# Patient Record
Sex: MALE | Race: WHITE | NOT HISPANIC OR LATINO | Employment: FULL TIME | URBAN - METROPOLITAN AREA
[De-identification: names, ages, dates, MRNs, and addresses within clinical notes are randomized per-mention and may not be internally consistent; named-entity substitution may affect disease eponyms.]

---

## 2024-07-09 DIAGNOSIS — Z00.00 HEALTH MAINTENANCE EXAMINATION: ICD-10-CM

## 2024-08-26 ENCOUNTER — HOSPITAL ENCOUNTER (OUTPATIENT)
Dept: CARDIOLOGY | Facility: HOSPITAL | Age: 44
Discharge: HOME | End: 2024-08-26
Payer: COMMERCIAL

## 2024-08-26 ENCOUNTER — HOSPITAL ENCOUNTER (OUTPATIENT)
Dept: VASCULAR MEDICINE | Facility: HOSPITAL | Age: 44
Discharge: HOME | End: 2024-08-26
Payer: COMMERCIAL

## 2024-08-26 ENCOUNTER — HOSPITAL ENCOUNTER (OUTPATIENT)
Dept: RADIOLOGY | Facility: HOSPITAL | Age: 44
Discharge: HOME | End: 2024-08-26
Payer: COMMERCIAL

## 2024-08-26 ENCOUNTER — ALLIED HEALTH (OUTPATIENT)
Dept: INTEGRATIVE MEDICINE | Facility: CLINIC | Age: 44
End: 2024-08-26

## 2024-08-26 ENCOUNTER — APPOINTMENT (OUTPATIENT)
Dept: PRIMARY CARE | Facility: CLINIC | Age: 44
End: 2024-08-26

## 2024-08-26 ENCOUNTER — OFFICE VISIT (OUTPATIENT)
Dept: DERMATOLOGY | Facility: CLINIC | Age: 44
End: 2024-08-26
Payer: COMMERCIAL

## 2024-08-26 ENCOUNTER — APPOINTMENT (OUTPATIENT)
Dept: PRIMARY CARE | Facility: CLINIC | Age: 44
End: 2024-08-26
Payer: COMMERCIAL

## 2024-08-26 VITALS
BODY MASS INDEX: 30.35 KG/M2 | HEART RATE: 61 BPM | OXYGEN SATURATION: 99 % | WEIGHT: 212 LBS | HEIGHT: 70 IN | SYSTOLIC BLOOD PRESSURE: 112 MMHG | DIASTOLIC BLOOD PRESSURE: 68 MMHG

## 2024-08-26 DIAGNOSIS — D18.01 CHERRY ANGIOMA: ICD-10-CM

## 2024-08-26 DIAGNOSIS — R93.1 AGATSTON CAC SCORE, <100: ICD-10-CM

## 2024-08-26 DIAGNOSIS — Z00.00 HEALTHCARE MAINTENANCE: Primary | ICD-10-CM

## 2024-08-26 DIAGNOSIS — Z00.00 HEALTH MAINTENANCE EXAMINATION: ICD-10-CM

## 2024-08-26 DIAGNOSIS — Z00.00 HEALTH MAINTENANCE EXAMINATION: Primary | ICD-10-CM

## 2024-08-26 DIAGNOSIS — D22.9 MELANOCYTIC NEVUS, UNSPECIFIED LOCATION: ICD-10-CM

## 2024-08-26 DIAGNOSIS — Z13.6 ENCOUNTER FOR SCREENING FOR CARDIOVASCULAR DISORDERS: ICD-10-CM

## 2024-08-26 DIAGNOSIS — Z12.83 SCREENING EXAM FOR SKIN CANCER: Primary | ICD-10-CM

## 2024-08-26 DIAGNOSIS — R93.1 AGATSTON CORONARY ARTERY CALCIUM SCORE GREATER THAN 400: ICD-10-CM

## 2024-08-26 DIAGNOSIS — R09.89 OTHER SPECIFIED SYMPTOMS AND SIGNS INVOLVING THE CIRCULATORY AND RESPIRATORY SYSTEMS: ICD-10-CM

## 2024-08-26 DIAGNOSIS — Z87.2 HISTORY OF SUN-DAMAGED SKIN: ICD-10-CM

## 2024-08-26 LAB
25(OH)D3 SERPL-MCNC: 63 NG/ML (ref 30–100)
ALBUMIN SERPL BCP-MCNC: 4.7 G/DL (ref 3.4–5)
ALP SERPL-CCNC: 64 U/L (ref 33–120)
ALT SERPL W P-5'-P-CCNC: 14 U/L (ref 10–52)
ANION GAP SERPL CALC-SCNC: 14 MMOL/L (ref 10–20)
AST SERPL W P-5'-P-CCNC: 21 U/L (ref 9–39)
BASOPHILS # BLD AUTO: 0.03 X10*3/UL (ref 0–0.1)
BASOPHILS NFR BLD AUTO: 0.6 %
BILIRUB SERPL-MCNC: 1 MG/DL (ref 0–1.2)
BUN SERPL-MCNC: 12 MG/DL (ref 6–23)
CALCIUM SERPL-MCNC: 9.5 MG/DL (ref 8.6–10.3)
CHLORIDE SERPL-SCNC: 104 MMOL/L (ref 98–107)
CHOLEST SERPL-MCNC: 149 MG/DL (ref 0–199)
CHOLESTEROL/HDL RATIO: 3.7
CO2 SERPL-SCNC: 24 MMOL/L (ref 21–32)
CREAT SERPL-MCNC: 0.8 MG/DL (ref 0.5–1.3)
CRP SERPL HS-MCNC: 2.5 MG/L
EGFRCR SERPLBLD CKD-EPI 2021: >90 ML/MIN/1.73M*2
EOSINOPHIL # BLD AUTO: 0.07 X10*3/UL (ref 0–0.7)
EOSINOPHIL NFR BLD AUTO: 1.4 %
ERYTHROCYTE [DISTWIDTH] IN BLOOD BY AUTOMATED COUNT: 12.5 % (ref 11.5–14.5)
EST. AVERAGE GLUCOSE BLD GHB EST-MCNC: 97 MG/DL
FERRITIN SERPL-MCNC: 214 NG/ML (ref 20–300)
GGT SERPL-CCNC: 14 U/L (ref 5–64)
GLUCOSE SERPL-MCNC: 93 MG/DL (ref 74–99)
HBA1C MFR BLD: 5 %
HCT VFR BLD AUTO: 47.1 % (ref 41–52)
HDLC SERPL-MCNC: 40.3 MG/DL
HGB BLD-MCNC: 16.2 G/DL (ref 13.5–17.5)
IMM GRANULOCYTES # BLD AUTO: 0.04 X10*3/UL (ref 0–0.7)
IMM GRANULOCYTES NFR BLD AUTO: 0.8 % (ref 0–0.9)
INSULIN P FAST SERPL-ACNC: 4 UIU/ML (ref 3–25)
IRON SATN MFR SERPL: 48 % (ref 25–45)
IRON SERPL-MCNC: 180 UG/DL (ref 35–150)
LDLC SERPL CALC-MCNC: 98 MG/DL
LYMPHOCYTES # BLD AUTO: 1.06 X10*3/UL (ref 1.2–4.8)
LYMPHOCYTES NFR BLD AUTO: 20.9 %
MAGNESIUM SERPL-MCNC: 2.2 MG/DL (ref 1.6–2.4)
MCH RBC QN AUTO: 30.7 PG (ref 26–34)
MCHC RBC AUTO-ENTMCNC: 34.4 G/DL (ref 32–36)
MCV RBC AUTO: 89 FL (ref 80–100)
MONOCYTES # BLD AUTO: 0.51 X10*3/UL (ref 0.1–1)
MONOCYTES NFR BLD AUTO: 10 %
NEUTROPHILS # BLD AUTO: 3.37 X10*3/UL (ref 1.2–7.7)
NEUTROPHILS NFR BLD AUTO: 66.3 %
NON HDL CHOLESTEROL: 109 MG/DL (ref 0–149)
NRBC BLD-RTO: 0 /100 WBCS (ref 0–0)
PLATELET # BLD AUTO: 249 X10*3/UL (ref 150–450)
POC APPEARANCE, URINE: CLEAR
POC BILIRUBIN, URINE: NEGATIVE
POC BLOOD, URINE: NEGATIVE
POC COLOR, URINE: YELLOW
POC GLUCOSE, URINE: NEGATIVE MG/DL
POC KETONES, URINE: NEGATIVE MG/DL
POC LEUKOCYTES, URINE: NEGATIVE
POC NITRITE,URINE: NEGATIVE
POC PH, URINE: 6 PH
POC PROTEIN, URINE: NEGATIVE MG/DL
POC SPECIFIC GRAVITY, URINE: 1.01
POC UROBILINOGEN, URINE: 0.2 EU/DL
POTASSIUM SERPL-SCNC: 4.2 MMOL/L (ref 3.5–5.3)
PROT SERPL-MCNC: 6.8 G/DL (ref 6.4–8.2)
PSA SERPL-MCNC: 0.75 NG/ML
RBC # BLD AUTO: 5.27 X10*6/UL (ref 4.5–5.9)
SODIUM SERPL-SCNC: 138 MMOL/L (ref 136–145)
T4 FREE SERPL-MCNC: 1.01 NG/DL (ref 0.61–1.12)
TIBC SERPL-MCNC: 373 UG/DL (ref 240–445)
TRIGL SERPL-MCNC: 54 MG/DL (ref 0–149)
TSH SERPL-ACNC: 1.24 MIU/L (ref 0.44–3.98)
UIBC SERPL-MCNC: 193 UG/DL (ref 110–370)
URATE SERPL-MCNC: 5.6 MG/DL (ref 4–7.5)
VIT B12 SERPL-MCNC: 413 PG/ML (ref 211–911)
VLDL: 11 MG/DL (ref 0–40)
WBC # BLD AUTO: 5.1 X10*3/UL (ref 4.4–11.3)

## 2024-08-26 PROCEDURE — 83735 ASSAY OF MAGNESIUM: CPT

## 2024-08-26 PROCEDURE — 93018 CV STRESS TEST I&R ONLY: CPT | Performed by: INTERNAL MEDICINE

## 2024-08-26 PROCEDURE — 86141 C-REACTIVE PROTEIN HS: CPT

## 2024-08-26 PROCEDURE — 75571 CT HRT W/O DYE W/CA TEST: CPT

## 2024-08-26 PROCEDURE — 84550 ASSAY OF BLOOD/URIC ACID: CPT

## 2024-08-26 PROCEDURE — 71046 X-RAY EXAM CHEST 2 VIEWS: CPT

## 2024-08-26 PROCEDURE — 83540 ASSAY OF IRON: CPT

## 2024-08-26 PROCEDURE — 93880 EXTRACRANIAL BILAT STUDY: CPT | Mod: REDUCED SERVICES | Performed by: INTERNAL MEDICINE

## 2024-08-26 PROCEDURE — 83036 HEMOGLOBIN GLYCOSYLATED A1C: CPT

## 2024-08-26 PROCEDURE — 93017 CV STRESS TEST TRACING ONLY: CPT

## 2024-08-26 PROCEDURE — 82172 ASSAY OF APOLIPOPROTEIN: CPT

## 2024-08-26 PROCEDURE — 84439 ASSAY OF FREE THYROXINE: CPT

## 2024-08-26 PROCEDURE — 84153 ASSAY OF PSA TOTAL: CPT

## 2024-08-26 PROCEDURE — NUTCO NUTRITION CONSULTATION: Performed by: DIETITIAN, REGISTERED

## 2024-08-26 PROCEDURE — 82977 ASSAY OF GGT: CPT

## 2024-08-26 PROCEDURE — 93016 CV STRESS TEST SUPVJ ONLY: CPT | Performed by: INTERNAL MEDICINE

## 2024-08-26 PROCEDURE — 84402 ASSAY OF FREE TESTOSTERONE: CPT

## 2024-08-26 PROCEDURE — 82728 ASSAY OF FERRITIN: CPT

## 2024-08-26 PROCEDURE — 76706 US ABDL AORTA SCREEN AAA: CPT | Performed by: INTERNAL MEDICINE

## 2024-08-26 PROCEDURE — 82306 VITAMIN D 25 HYDROXY: CPT

## 2024-08-26 PROCEDURE — 85025 COMPLETE CBC W/AUTO DIFF WBC: CPT

## 2024-08-26 PROCEDURE — 83525 ASSAY OF INSULIN: CPT

## 2024-08-26 PROCEDURE — 83550 IRON BINDING TEST: CPT

## 2024-08-26 PROCEDURE — 93880 EXTRACRANIAL BILAT STUDY: CPT | Mod: 52

## 2024-08-26 PROCEDURE — 84443 ASSAY THYROID STIM HORMONE: CPT

## 2024-08-26 PROCEDURE — 80053 COMPREHEN METABOLIC PANEL: CPT

## 2024-08-26 PROCEDURE — 99203 OFFICE O/P NEW LOW 30 MIN: CPT | Performed by: STUDENT IN AN ORGANIZED HEALTH CARE EDUCATION/TRAINING PROGRAM

## 2024-08-26 PROCEDURE — SMAR2 SMART-UHCSM: Performed by: PHARMACIST

## 2024-08-26 PROCEDURE — 80061 LIPID PANEL: CPT

## 2024-08-26 PROCEDURE — 82607 VITAMIN B-12: CPT

## 2024-08-26 PROCEDURE — 76706 US ABDL AORTA SCREEN AAA: CPT

## 2024-08-26 ASSESSMENT — PATIENT GLOBAL ASSESSMENT (PGA): PATIENT GLOBAL ASSESSMENT: PATIENT GLOBAL ASSESSMENT:  1 - CLEAR

## 2024-08-26 ASSESSMENT — DERMATOLOGY PATIENT ASSESSMENT
DO YOU USE A TANNING BED: NO
DO YOU USE SUNSCREEN: OCCASIONALLY
HAVE YOU HAD OR DO YOU HAVE A STAPH INFECTION: NO
HAVE YOU HAD OR DO YOU HAVE VASCULAR DISEASE: NO
ARE YOU AN ORGAN TRANSPLANT RECIPIENT: NO
DO YOU HAVE ANY NEW OR CHANGING LESIONS: NO

## 2024-08-26 ASSESSMENT — DERMATOLOGY QUALITY OF LIFE (QOL) ASSESSMENT
ARE THERE EXCLUSIONS OR EXCEPTIONS FOR THE QUALITY OF LIFE ASSESSMENT: NO
DATE THE QUALITY-OF-LIFE ASSESSMENT WAS COMPLETED: 67078
WHAT SINGLE SKIN CONDITION LISTED BELOW IS THE PATIENT ANSWERING THE QUALITY-OF-LIFE ASSESSMENT QUESTIONS ABOUT: NONE OF THE ABOVE
RATE HOW BOTHERED YOU ARE BY SYMPTOMS OF YOUR SKIN PROBLEM (EG, ITCHING, STINGING BURNING, HURTING OR SKIN IRRITATION): 0 - NEVER BOTHERED
RATE HOW BOTHERED YOU ARE BY EFFECTS OF YOUR SKIN PROBLEMS ON YOUR ACTIVITIES (EG, GOING OUT, ACCOMPLISHING WHAT YOU WANT, WORK ACTIVITIES OR YOUR RELATIONSHIPS WITH OTHERS): 0 - NEVER BOTHERED
RATE HOW EMOTIONALLY BOTHERED YOU ARE BY YOUR SKIN PROBLEM (FOR EXAMPLE, WORRY, EMBARRASSMENT, FRUSTRATION): 0 - NEVER BOTHERED

## 2024-08-26 ASSESSMENT — ITCH NUMERIC RATING SCALE: HOW SEVERE IS YOUR ITCHING?: 0

## 2024-08-26 ASSESSMENT — PAIN SCALES - GENERAL: PAINLEVEL: 0-NO PAIN

## 2024-08-26 NOTE — PROGRESS NOTES
"Edwardo Izquierdo is a 44 y.o. male presents to [facility] for stress management and resilience training in coordination with  [facility]. Lives with wife.      What are your top priorities (personal and professional)?   Coming off of 4 year streak of being on the road - this year and going forward I want to \"get some of my personal life back.\"   \"Trying to get my health back and work less\"   Bosses are on the same page  Setting boundaries is tough, \"I need to delegate more\"   Although days might be long, taking time in between to workout, eat with wife, walk on the beach.     Current sources of stress?   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 tension, breathing quickly): Shoulders and neck.   Mental/Emotional (ex. poor focus, rumination, irritable): Get short, stress builds with constant inflow of problems that stack up.       Current Self-Care/Stress Management Strategy:  Chronic stress can effect your ability to perform optimally both mentally and physically and has been linked to inflammatory conditions and chronic diseases such as elevated cholesterol, diabetes, and gastric ulcers.   When the parasympathetic nervous system is activated, the body enters a state of relaxation, allowing for recovery, repair, and immune system activation   Currently:   Experienced \"burn out\" over the past 3-4 years, has started delegating more, training/getting others ready.   Working out - helps mentally and physically  While traveling in hotels- spin, elliptical, dumb bells  While home- mountain bike, spin, rowing  Enjoys workouts he can \"go after\" to release irritation/anger/calm down.   Currently 3-5 days/week workout   20-60 mins    Anything you have been considering that you haven't tried yet?   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   Supplements?   MVI most days      Building Resilience   Mindful Eating:  Current: Eating on the go for everything between breakfast and dinner.   Has strategy already for this - planning to pack food, take 15 mins to eat at the job site.   Recommended sitting for meals, away from your desk  Discontinue working, electronics, stressful conversations, news, etc. during mealtime  3 deep breaths, feel feet on floor  \"Breathing in I calm my body, breathing out, I smile\" x 3  Engage your senses. Feel gratitude for your food  Feel the saliva pool in your mouth prior to taking the first bite.   Mindful eating improves overall digestion.   Eat the [employer]  Gut brain connection, mental health  Book: \"This is Your Brain on Food\" Dr. Chantelle See    2. Enjoyable Movement:   Spinning, rowing, walking on the beach, being outdoors - even when traveling chooses walkable hotels. Finds walking without and agenda and being outside relaxing.     3. Restorative Sleep  Quantity (how much): 6 hours  Quality (continuous vs. fragmented): Fragmented, tossing and turning  Regularity (sleep/wake time the same +/- 30 mins):  Variable  The first half of the night is dominated by non-REM deep sleep; the second half of our sleep is dominated by REM sleep  If you sleep hours later than usual (maybe because of an event) - you will likely have more REM sleep cycles and less deep sleep  Deep sleep (non-REM) is critical for learning and memory, immune repair and replenishment, metabolic function (blood sugar regulation), hormone optimization  REM sleep allows you to dream, problem solve, and regulate emotions.     Timing (in alignment with chronotype): Type 1-2 (morning person)  Chronotypes: (1) extreme morning type (sleep at 8pm, wake up at 4am); (2) morning type (sleep at 9pm, wake up at 6am); (3) neutral (sleep at 11pm, wake up at 7:30am); (4) evening type (sleep at 1am, wake up around 9:30am); (5) extreme " "evening type (sleep at 2:30am, waking up mid morning)  Bedtime currently variable at night, regular in the morning. Sometimes staying up for TV show, work, hobbies.    Last food: 7-8pm  Last exercise: Morning workouts, evening walking 7-9pm, then sits to relax prior to bed.   Last caffeine: 2 pm, 1-2 cups of coffee daily, not always afternoon, but enjoys espresso and finds it relaxing.   Last alcohol: 2-4 drinks/week, mostly weekend  Cut back on drinking 4 years ago, had noticed he didn't sleep well when he drank. Doesn't have much of an interest in drinking anymore.   Time spent on social media: Zero  Bedroom Temp:   Optimal temperature for sleep is 65 degrees Fahrenheit (18.3 degrees Celsius): 70 degrees, prefers warmer room while sleeping  To optimize circadian rhythm may consider removing unnecessary sources of light from the bedroom in addition to utilizing a sleep mask or blackout shades.    Remove electronics (phone, TV, computers, etc.) from the bedroom as these are sources of \"blue light\" which disrupts the circadian rhythm.   Will look for blue light filter on his devices  Medications/supplements for sleep?   On occasion takes a multi-vitamin stress/sleep supplement  Travel does involve different time zones, \"a little jet lag\" but not bothersome.   Recommend 1-3 mg of melatonin as needed or ashwagandha 600mg in the evening.   May consider lemon balm or leonel tea in the evening    3. Spirituality and Social Connection  Spirituality: \"I don't think about it, I think I'm ok\"   Social connection: More of an introvert, by the end of the day not looking for social interaction. Appreciates time to recharge alone.   Social connection improves your ability to recover from stress, anxiety, and depression as well as to experience a greater sense of overall well-being.      I  Heart Rate Variability (HRV)  Reflects the heart’s adaptability to different situations and provides insights into stress levels and overall " Spontaneous, unlabored and symmetrical health and well-being.  HRV is unique to each individual.   A normal HRV for adults ranges from below 20 to over 200 milliseconds.  Each person’s HRV is unique, so compare your HRV to your averages and avoid comparisons to others.   It’s perfectly normal to observe daily and seasonal fluctuations in your HRV, a sign of your heart's adaptability.  Autonomic nervous system  Higher HRV associated with rest-and-digest, general fitness, and good recovery  Your rest-and-digest system (PNS) tells your heart to slow down, making room for variability between beats  Lower HRV with fight-or-flight responses, stress, illness, or over-training  Your fight-or-flight system (SNS) tells your heart to speed up, limiting space for variability (lower HRV)  Increasing HRV  Practice deep breathing exercises and meditations  Engage in regular physical activity  Prioritize restful sleep and address sleep disturbances  Focus on nourishing foods and stay hydrated    Vagus Nerve  Understanding the role of the vagus nerve in HRV and stress reduction is key.   Activating this nerve can be beneficial in reducing stress, increasing HRV, and improving vagal tone. A higher vagal tone is associated with better executive cognitive performance, emotional regulation, and overall health.    Cold water therapy:  Immerse your face in a bowl of ice water for 5-10 seconds.  End your shower with 30-60 seconds of cold water; increase as tolerated.  Advance to cold baths/plunges if desired (max benefit at 10 min/week).  Music therapy:  Humming, singing, playing an instrument, or dancing  In undergrad took music therapy class, and was surprised in how much he enjoyed it. Tries to listen to music and understands how it affects his mood.       Assessment/Plan:   Mr. Izquierdo plans to go to bed at a regular time (9pm).   Shutting off devices earlier.   Discussed physiologic changes that occur with acute vs. chronic stress, the autonomic nervous system, evidence re:  neuroplasticity of mindfulness, and different mindfulness practices. Also discussed lifestyle habits that support the Body's natural defense mechanisms.  Practiced breathing together.   Guided meditations:   Indra Pomogatel guided meditations   Headspace graciela  Calm graciela  [place] graciela  Heart Math device/graciela  Muse device/graciela  Yoga Online:  Yoga with Cecille [email or web address] (YouTube)  Yoga with Ailyn [email or web address] (YouTube)  [employer]

## 2024-08-26 NOTE — PROGRESS NOTES
Subjective     Edwardo Izquierdo is a 44 y.o. male who presents for the following: Skin Check (FBSE, no areas of concern. No HX of skin cancer).     Denies any personal or family history of skin cancer.  Denies any new or concerning lesions. Denies history of tanning bed use or blistering sunburns.    Review of Systems:  No other skin or systemic complaints other than what is documented elsewhere in the note.    The following portions of the chart were reviewed this encounter and updated as appropriate:         Skin Cancer History  No skin cancer on file.      Specialty Problems    None       Objective   Well appearing patient in no apparent distress; mood and affect are within normal limits.    A full examination was performed including scalp, head, eyes, ears, nose, lips, neck, chest, axillae, abdomen, back, buttocks, bilateral upper extremities, bilateral lower extremities, hands, feet, fingers, toes, fingernails, and toenails. All findings within normal limits unless otherwise noted below.    Assessment/Plan   1. Screening exam for skin cancer  No other lesions concerning for skin cancer.  Several small superficial red blanching papules consistent with hemangiomas.  Brown and tan macules and papules with reassuring findings on dermoscopy consistent with benign nevi.    Total Body Skin Exam  - No other lesions concerning for malignancy on the remainder the skin exam today  - The ugly duckling sign was discussed. Recommended to monitor for any skin lesions that are different in color, shape, or size than others on body  - Sun protection was discussed. Recommend SPF 30+, hats with brims, sun protective clothing, and avoiding sun exposure between 10 AM and 2 PM whenever possible  - Follow-up visit in clinic in 2-3 years    2. History of sun-damaged skin    3. Cherry angioma    4. Melanocytic nevus, unspecified location        Follow up in 2-3 years for a full body skin exam or sooner if needed    Timo Hagen MD    I  was present during all key portions of visit including history, exam, discussion/plan and/or procedures and directly supervised our resident during all portions of the visit, follow up care, medications and more    Kermit Walsh MD

## 2024-08-26 NOTE — PROGRESS NOTES
Reason for Nutrition Visit:  It was a pleasure meeting Mr. Izquierdo today to discuss diet and nutrition as part of his initial [facility].    Past Medical Hx:  There is no problem list on file for this patient.       Weight change:  His current weight is 212lbs.  His weight is stable.  Five years ago he was closer to 235lbs.  He is trying to lose weight and he was down under 210lbs, but has gained some recently.  He would ideally like his weight to be under 200lbs.   Significant Weight Change: No    Lab Results   Component Value Date    HGBA1C 5.2 07/07/2023    CHOL 149 08/26/2024    LDLCALC 98 08/26/2024    TRIG 54 08/26/2024    HDL 40.3 08/26/2024        Food and Nutrition Hx:  He travels a lot for work.  It is quite variable.    He reports grazing throughout the day.  In the morning he will eat something small such as yogurt or RX bar.  Lunch he grazes all day on things such as fruit (apples, bananas), That's It fruit bars, RX bars, [place] granola bars, nuts, or peanut butter cracker packages.  He is really busy and on his job sites which are somewhat remote and can make it difficult to get to a restaurant.  When he is home, he will try to bring leftovers to work.  When he travels (within the ), he tries to stop at a store and stock up on snacks that he can take to job sites.  He considers dinner to be his most balanced meal of the day.  He enjoys hummus and will eat this with vegetables.  When he is home he and his wife will share cooking dinner responsibilities.  He will eat out for dinner about twice a week.  He is eating fish 1-2 times a week.  When he is traveling he will try to grab canned sardines or mackerel.      24 Hour Food Recall:  6:00am: Chobani Greek yogurt  10:00am: Garber Snaps (dried snap peas)  2:00pm: fried perch, periogies   7:00pm: mixed nuts    Beverages: water-6-10 water bottles a day; coffee-1-2 cups a day (black); alcohol-2-4 drinks a week, liquor or wine    Allergies:  None  Intolerance: None    GI Symptoms : None He has a bowel movement everyday.     Exercise: 3-5 days a week.  He mixes strength training (dumbbells) for 10-15 minutes and cardio (rower, elliptical, spinning bike) for 20 minutes.  He averages 30 minutes for his workouts.  He will occasionally go out mountain biking for  minutes, about twice a month.  He enjoys walking and will incorporate this when he can.      Sleep duration/quality : 6 hours a night.  Sometimes he has a hard time falling asleep.  He reports tossing and turning.      Supplements: Multivitamin  3-4 days a week  (Centrum brand)    Cravings: None  Energy Levels: High, he does have an afternoon slump where he uses coffee to give him a boost in energy.      Estimated Nutrient Needs:    Calories for Weight Maintenance: 5074-9281 ([place] St. Jeor x 1.3-1.4 activity factor)  Calories for Weight Loss: 2000  Protein Needs: 140g/day (0.7g/lb DBW, 200lbs)    Nutrition Diagnosis:    Diagnosis Statement 1:  Diagnosis Status: New  Diagnosis : Inadequate protein intake  related to  food- and nutrition-related knowledge deficit regarding appropriate protein intake  as evidenced by  food recall showing he is not getting the recommended 140g protein per day    Diagnosis Statement 2:  Diagnosis Status: New  Diagnosis : Inadequate fiber intake  related to food and nutrition related knowledge deficit concerning desirable quantities of fiber as evidenced by  food recall showing that he is not meeting the recommended 35-40g fiber per day    Diagnosis Statement 3:   Diagnosis Status: New  Diagnosis : Overweight related to  food- and nutrition-related knowledge deficit  as evidenced by  BMI of 30, frequent traveling for work leading to inconsistent meal structure and meals eaten out    Nutrition Interventions:  Increased Fiber Diet and Increased Protein Diet      Nutrition Goals:  Nutrition Goals : Blood glucose control  Consistent meal/snack pattern  Reduce Kcal  Intake  Weight Loss  Adequate protein intake  Adequate fiber intake    Nutrition Recommendations:    1) To promote weight loss, which should also keep lipid profile and glucose tolerance in line, try to stick to a 2000 calorie a day meal plan.    2) To help create well balanced meals while being mindful of portion sizes, use the plate model for portioning out your meals.  Fill 1/2 of your plate with non-starchy vegetables, 1/4 of your plate with lean protein (~6-7oz per meal), and 1/4 of your plate with complex carbohydrates/whole grains.  Each meal should contain the following 3 components: protein + fiber + healthy fats.    3) Aim for 35-40g fiber daily.  One way to help you reach this goal is to include non-starchy vegetables with both lunch and dinner (at least 1-2 cups).  Be sure to include a wide variety of colorful vegetables throughout the week.  Also, be sure to use 100% whole wheat/whole grain breads/pastas, brown/wild rice, quinoa, oats, beans, lentils, starchy vegetables-potatoes, sweet potatoes, corn, peas, winter squash and limit/avoid white, processed carbohydrates (white bread, white pasta, white rice, etc).    4) Ensure you are getting adequate amounts of protein consistently throughout the day.  Your daily protein goal is 140g.  Aim for 40-50g per meal and include 10-15g protein at snacks.    5) Options you can use for either breakfast or lunches while traveling to help you boost protein and fiber:  -Greek yogurt + Susanne Crunch cereal + nuts/seeds + fruit  -oatmeal + protein powder  -OWYN pre-made protein drink (other brands to look for when traveling if you cannot find this option: Iconic grass fed whey; Hollins; Orgain)  -cottage cheese + fruit  -canned fish - sardines, mackerel    6) Healthy snack options to bring with your or look for in stores when you are traveling:  -nuts  -Mejia's nut butter packets  -jerky/meat sticks (Chomps, Country [place], EPIC)  -Brami beans individual  packets  -roasted chick peas  -protein bars - RX, Aloha, No Cow    7) Consider using pre-made meals that are delivered to your home such as the ones you can order from [employer]    8) For better blood sugar regulation:  -space out meals and snacks at least 3-4 hours apart  -do not eat carbohydrates by themselves; pair with protein  -have an overnight fasting window of at least 12-14 hours  -avoid night time snacking  -eat meals in the following order: 1) vegetables, 2) protein, 3) carbohydrates    Educational Handouts: Use a Plate to Plan Your Meals

## 2024-08-28 LAB — LPA SERPL-MCNC: 14 MG/DL

## 2024-08-28 RX ORDER — ATORVASTATIN CALCIUM 20 MG/1
20 TABLET, FILM COATED ORAL DAILY
Qty: 100 TABLET | Refills: 3 | Status: SHIPPED | OUTPATIENT
Start: 2024-08-28 | End: 2025-10-02

## 2024-08-28 ASSESSMENT — ENCOUNTER SYMPTOMS
HEADACHES: 0
ANAL BLEEDING: 0
NUMBNESS: 0
CONSTIPATION: 0
ALLERGIC/IMMUNOLOGIC NEGATIVE: 1
BRUISES/BLEEDS EASILY: 0
PALPITATIONS: 0
HEMATOLOGIC/LYMPHATIC NEGATIVE: 1
POLYPHAGIA: 0
EYE ITCHING: 0
APNEA: 0
JOINT SWELLING: 0
APPETITE CHANGE: 0
SLEEP DISTURBANCE: 0
SINUS PRESSURE: 0
COLOR CHANGE: 0
POLYDIPSIA: 0
NECK STIFFNESS: 0
WEAKNESS: 0
MYALGIAS: 0
NEUROLOGICAL NEGATIVE: 1
EYE PAIN: 0
VOMITING: 0
CHEST TIGHTNESS: 0
GASTROINTESTINAL NEGATIVE: 1
LIGHT-HEADEDNESS: 0
DIARRHEA: 0
RHINORRHEA: 0
CHILLS: 0
BACK PAIN: 0
ABDOMINAL DISTENTION: 0
NERVOUS/ANXIOUS: 0
TREMORS: 0
CONFUSION: 0
FLANK PAIN: 0
EYE REDNESS: 0
UNEXPECTED WEIGHT CHANGE: 0
VOICE CHANGE: 0
EYE DISCHARGE: 0
ARTHRALGIAS: 0
AGITATION: 0
ABDOMINAL PAIN: 0
SINUS PAIN: 0
SORE THROAT: 0
DYSURIA: 0
WOUND: 0
FREQUENCY: 0
WHEEZING: 0
DIZZINESS: 0
HEMATURIA: 0
ENDOCRINE NEGATIVE: 1
COUGH: 0
NECK PAIN: 0
DIFFICULTY URINATING: 0
FATIGUE: 0
SHORTNESS OF BREATH: 0
RECTAL PAIN: 0
SEIZURES: 0
BLOOD IN STOOL: 0
TROUBLE SWALLOWING: 0
ADENOPATHY: 0
NAUSEA: 0
PHOTOPHOBIA: 0
DIAPHORESIS: 0
FEVER: 0

## 2024-08-28 NOTE — PROGRESS NOTES
Executive Physical         Patient ID: Edwardo Izquierdo is a 44 y.o. male who presents for Executive Evaluation:    The following report is in reference to your  executive physical examination which was held at [facility] on 08/28/24. Firstly, let me state that it was a pleasure meeting with you and that we appreciate you coming to [facility] for your executive evaluation.    At the time of your evaluation you were feeling well with no significant health concerns.    You were not complaining of fever ,chills, headache ,dizziness ,cough, chest pain shortness of breath, palpitations, nausea, vomiting, abdominal pain, loss of appetite, diarrhea, blood in the stools, frequent urination or painful urination.    No past medical history on file.      Review of Systems   Constitutional:  Negative for appetite change, chills, diaphoresis, fatigue, fever and unexpected weight change.   HENT:  Negative for congestion, ear discharge, ear pain, hearing loss, mouth sores, nosebleeds, postnasal drip, rhinorrhea, sinus pressure, sinus pain, sneezing, sore throat, tinnitus, trouble swallowing and voice change.    Eyes:  Negative for photophobia, pain, discharge, redness, itching and visual disturbance.   Respiratory:  Negative for apnea, cough, chest tightness, shortness of breath and wheezing.    Cardiovascular:  Negative for chest pain, palpitations and leg swelling.   Gastrointestinal: Negative.  Negative for abdominal distention, abdominal pain, anal bleeding, blood in stool, constipation, diarrhea, nausea, rectal pain and vomiting.   Endocrine: Negative.  Negative for cold intolerance, heat intolerance, polydipsia, polyphagia and polyuria.   Genitourinary:  Negative for decreased urine volume, difficulty urinating, dysuria, enuresis, flank pain, frequency, hematuria, penile discharge, penile pain, scrotal swelling, testicular pain and urgency.   Musculoskeletal:   "Negative for arthralgias, back pain, gait problem, joint swelling, myalgias, neck pain and neck stiffness.   Skin:  Negative for color change, pallor, rash and wound.   Allergic/Immunologic: Negative.    Neurological: Negative.  Negative for dizziness, tremors, seizures, syncope, weakness, light-headedness, numbness and headaches.   Hematological: Negative.  Negative for adenopathy. Does not bruise/bleed easily.   Psychiatric/Behavioral:  Negative for agitation, confusion, sleep disturbance and suicidal ideas. The patient is not nervous/anxious.                 Family History   Problem Relation Name Age of Onset    Hyperlipidemia Father      Diabetes Maternal Grandfather      Stroke Paternal Grandmother      Heart disease Paternal Grandfather          No Known Allergies         Visit Vitals  /68 (BP Location: Right arm, Patient Position: Sitting, BP Cuff Size: Large adult)   Pulse 61   Ht 1.778 m (5' 10\")   Wt 96.2 kg (212 lb)   SpO2 99%   BMI 30.42 kg/m²   Smoking Status Never   BSA 2.18 m²      Waist circumference =38 inches    Physical Exam  Vitals reviewed.   Constitutional:       Appearance: Normal appearance.   HENT:      Head: Normocephalic and atraumatic.      Right Ear: Tympanic membrane, ear canal and external ear normal.      Left Ear: Tympanic membrane, ear canal and external ear normal.      Nose: Nose normal.      Mouth/Throat:      Mouth: Mucous membranes are moist.   Eyes:      Extraocular Movements: Extraocular movements intact.      Conjunctiva/sclera: Conjunctivae normal.      Pupils: Pupils are equal, round, and reactive to light.   Cardiovascular:      Rate and Rhythm: Normal rate and regular rhythm.      Pulses: Normal pulses.      Heart sounds: Normal heart sounds. No murmur heard.     No friction rub. No gallop.   Pulmonary:      Effort: Pulmonary effort is normal. No respiratory distress.      Breath sounds: Normal breath sounds. No wheezing, rhonchi or rales.   Chest:      Chest wall: " No tenderness.   Abdominal:      General: Abdomen is flat. Bowel sounds are normal. There is no distension.      Palpations: Abdomen is soft. There is no mass.      Tenderness: There is no abdominal tenderness. There is no guarding or rebound.      Hernia: No hernia is present.   Genitourinary:     Penis: Normal.       Testes: Normal.      Prostate: Normal.      Rectum: Normal. Guaiac result negative.   Musculoskeletal:         General: No swelling, tenderness or deformity. Normal range of motion.      Cervical back: Normal range of motion.   Skin:     General: Skin is warm.      Findings: No bruising, lesion or rash.   Neurological:      General: No focal deficit present.      Mental Status: He is alert and oriented to person, place, and time.      Sensory: No sensory deficit.      Motor: No weakness.      Coordination: Coordination normal.   Psychiatric:         Mood and Affect: Mood normal.         Behavior: Behavior normal.        Ancillary studies:    Vision screening- Deferred    Bone density - Normal     Audiogram -Normal     Discussion/Summary  In summary you are 44 y.o. male with no  past medical history of note .  At the time of your evaluation you were feeling well with no significant health concerns.    Physical examination including blood pressure and cardiovascular exam was unremarkable. The BMI / % body fat was elevated.    Lab studies including complete blood count, comprehensive metabolic panel, fasting lipid panel, thyroid function, Prostate  specific antigen (PSA), Vitamin D, Vitamin B12, Magnesium and inflammatory markers were normal.      Cardiology- Normal EKG, Exercise stress test, Carotid and Abdominal Aorta ultrasound studies.     Abnormal  CT-Cardiac score > 1000 which is consistent with extensive plaque burden. There is a high likelihood of at least one significant coronary stenosis ( > 50% diameter )    Elevated LDL-cholesterol levels > 70- Limit animal fats( red meat, cheese, shell fish,   egg yolks, full fat dairy/yoghurt and processed meats).  Instead, replace some of the saturated fats in your diet with healthier unsaturated fats, which are found in fish, nuts, avocados and vegetable oils, such as olive oil, canola oil and safflower oil.  Please see link below for additional information:    https://[email or web address].org.uk/low-cholesterol-foods/choose-low-cholesterol-food     Start Atorvastatin 20 mg daily. Repeat Cholesterol levels in 4-6 weeks. Lab orders   placed in Epic      Elevated BMI= 30 ( 19-25) - Increase protein/fish/fiber/fruit/vegetables/water  whole grain intake  and limit processed/refined carbohydrates and added sugars. Increased physical activity to a minimum of 150 minutes of moderate exercise per week.      Cancer screening- you are current with prostate and lung cancer screening tests. Recommend screening colonoscopy at your earliest convenience after cardiac evaluation.    Skin - Please follow up with dermatology for annual examination.Please use a broad-spectrum sunscreen with an SPF of 30 or higher. Monitor moles for ABCDE ( asymmetry, border irregularity, color changes, diameter> pencil eraser and evolving )    Vaccine- I would  recommend a shingles (Shingrix) vaccine at age 50  onwards.  In addition I recommend a tetanus booster every 10 years to maintain immunity.  Recommend pneumonia vaccine (Prevnar 20) at age  65.  Consider COVID-19 booster and flu shot next fall.      Wellness: Please continue with a balanced diet and a regular physical activity program for at least 150 minutes/week of moderate exercise and 30 minutes/week of resistance/weight training per week.  Try to get 6 to 8 hours of sleep per night.  Please download the calm or [email or web address] rené from the [employer] to assist with stress and sleep management if necessary.    In conclusion,  I wish to thank you for attending the  executive health program at [facility].  I wish you and your family a safe and  healthy Summer/Fall  season.    Please email me at [email or web address] or call me at [phone] if you have any questions pertaining to this report or any other medical concerns.    Be Well    Dr KATE Crowder and Imani Gamble Master Clinician in Wellness    Senior Attending Physician , [facility] [place]    [facility]    Clinical     [employer] [employer]    [place], [place]    This note was partially generated using the Dragon voice recognition system.  There may be some incorrect wording ,grammar, spelling or punctuation errors that were not corrected prior to committing the note to the medical record.

## 2024-08-30 ENCOUNTER — APPOINTMENT (OUTPATIENT)
Dept: LAB | Facility: LAB | Age: 44
End: 2024-08-30
Payer: COMMERCIAL

## 2024-08-30 LAB
TESTOSTERONE FREE (CHAN): 62.3 PG/ML (ref 35–155)
TESTOSTERONE,TOTAL,LC-MS/MS: 436 NG/DL (ref 250–1100)

## 2024-09-20 ENCOUNTER — LAB (OUTPATIENT)
Dept: LAB | Facility: LAB | Age: 44
End: 2024-09-20
Payer: COMMERCIAL

## 2024-09-20 DIAGNOSIS — R93.1 AGATSTON CORONARY ARTERY CALCIUM SCORE GREATER THAN 400: ICD-10-CM

## 2024-09-20 LAB
CHOLEST SERPL-MCNC: 95 MG/DL (ref 0–199)
CHOLESTEROL/HDL RATIO: 2.4
HDLC SERPL-MCNC: 40.1 MG/DL
LDLC SERPL CALC-MCNC: 46 MG/DL
NON HDL CHOLESTEROL: 55 MG/DL (ref 0–149)
TRIGL SERPL-MCNC: 45 MG/DL (ref 0–149)
VLDL: 9 MG/DL (ref 0–40)

## 2024-09-20 PROCEDURE — 80061 LIPID PANEL: CPT

## 2024-09-26 ENCOUNTER — OFFICE VISIT (OUTPATIENT)
Dept: CARDIOLOGY | Facility: CLINIC | Age: 44
End: 2024-09-26
Payer: COMMERCIAL

## 2024-09-26 VITALS
WEIGHT: 205 LBS | SYSTOLIC BLOOD PRESSURE: 126 MMHG | OXYGEN SATURATION: 98 % | BODY MASS INDEX: 30.36 KG/M2 | HEIGHT: 69 IN | HEART RATE: 66 BPM | DIASTOLIC BLOOD PRESSURE: 87 MMHG

## 2024-09-26 DIAGNOSIS — R93.1 AGATSTON CORONARY ARTERY CALCIUM SCORE GREATER THAN 400: ICD-10-CM

## 2024-09-26 PROCEDURE — 99214 OFFICE O/P EST MOD 30 MIN: CPT | Performed by: INTERNAL MEDICINE

## 2024-09-26 PROCEDURE — 93005 ELECTROCARDIOGRAM TRACING: CPT | Performed by: INTERNAL MEDICINE

## 2024-09-26 PROCEDURE — 3008F BODY MASS INDEX DOCD: CPT | Performed by: INTERNAL MEDICINE

## 2024-09-26 PROCEDURE — 99204 OFFICE O/P NEW MOD 45 MIN: CPT | Performed by: INTERNAL MEDICINE

## 2024-09-26 RX ORDER — NAPROXEN SODIUM 220 MG/1
81 TABLET, FILM COATED ORAL DAILY
Start: 2024-09-26 | End: 2025-09-26

## 2024-09-26 RX ORDER — CHOLECALCIFEROL (VITAMIN D3) 125 MCG
CAPSULE ORAL AS NEEDED
COMMUNITY

## 2024-09-26 RX ORDER — METOPROLOL TARTRATE 50 MG/1
50 TABLET ORAL ONCE
Qty: 1 TABLET | Refills: 0 | Status: SHIPPED | OUTPATIENT
Start: 2024-09-26 | End: 2024-09-26

## 2024-09-26 ASSESSMENT — ENCOUNTER SYMPTOMS
ALLERGIC/IMMUNOLOGIC NEGATIVE: 1
GASTROINTESTINAL NEGATIVE: 1
CARDIOVASCULAR NEGATIVE: 1
NEUROLOGICAL NEGATIVE: 1
CONSTITUTIONAL NEGATIVE: 1
MUSCULOSKELETAL NEGATIVE: 1
HEMATOLOGIC/LYMPHATIC NEGATIVE: 1
EYES NEGATIVE: 1
RESPIRATORY NEGATIVE: 1
PSYCHIATRIC NEGATIVE: 1
ENDOCRINE NEGATIVE: 1

## 2024-09-26 ASSESSMENT — PATIENT HEALTH QUESTIONNAIRE - PHQ9
SUM OF ALL RESPONSES TO PHQ9 QUESTIONS 1 AND 2: 0
1. LITTLE INTEREST OR PLEASURE IN DOING THINGS: NOT AT ALL
2. FEELING DOWN, DEPRESSED OR HOPELESS: NOT AT ALL

## 2024-09-26 ASSESSMENT — COLUMBIA-SUICIDE SEVERITY RATING SCALE - C-SSRS
6. HAVE YOU EVER DONE ANYTHING, STARTED TO DO ANYTHING, OR PREPARED TO DO ANYTHING TO END YOUR LIFE?: NO
1. IN THE PAST MONTH, HAVE YOU WISHED YOU WERE DEAD OR WISHED YOU COULD GO TO SLEEP AND NOT WAKE UP?: NO
2. HAVE YOU ACTUALLY HAD ANY THOUGHTS OF KILLING YOURSELF?: NO

## 2024-09-26 ASSESSMENT — PAIN SCALES - GENERAL: PAINLEVEL: 0-NO PAIN

## 2024-09-26 NOTE — PROGRESS NOTES
Preventive Cardiology Clinic Note    Reason for Visit: Elevated calcium score  Referring Clinician: Jesu     History of Present Illness: Edwardo Izquierdo is a 44 y.o. male with no prior medical history who is referred after an executive physical noted an elevated coronary calcium score on screening test.  He does not have a personal history of cardiovascular disease and is very active and does not have any exertional chest pain, shortness of breath, palpitations, or syncope.  He does have a family history of cardiovascular disease in his father.  He is a non-smoker.  Previously he did not take any chronic medications although he did start taking atorvastatin after his calcium score test came back abnormal.  He also did a stress test which was normal at the same time.  His calcium score showed a single-vessel RCA with a score of 1300 along with evidence of pericardial calcification potential sequela of prior inflammation.  He does have a history of COVID-19 infection several times as well as influenza.  He also has a family history of discoid lupus in his brother and has had a positive RENATO in the past with no clinical evidence of lupus.    Past Medical History:  Past medical history as above in HPI    Past Surgical History:  He has no past surgical history on file.    Social History:  He reports that he has never smoked. He does not have any smokeless tobacco history on file. He reports current alcohol use.    Family History:  Family History   Problem Relation Name Age of Onset    Hyperlipidemia Father      Diabetes Maternal Grandfather      Stroke Paternal Grandmother      Heart disease Paternal Grandfather         Allergies:  Patient has no known allergies.    Outpatient Medications:  Current Outpatient Medications   Medication Instructions    aspirin 81 mg, oral, Daily    atorvastatin (LIPITOR) 20 mg, oral, Daily    naproxen sodium 220 mg capsule oral, As needed       Review of Systems:  Review of Systems  "  Constitutional: Negative.   HENT: Negative.     Eyes: Negative.    Cardiovascular: Negative.    Respiratory: Negative.     Endocrine: Negative.    Hematologic/Lymphatic: Negative.    Skin: Negative.    Musculoskeletal: Negative.    Gastrointestinal: Negative.    Genitourinary: Negative.    Neurological: Negative.    Psychiatric/Behavioral: Negative.     Allergic/Immunologic: Negative.        Last Recorded Vitals:  Vitals:    09/26/24 1020   BP: 126/87   BP Location: Left arm   Patient Position: Sitting   Pulse: 66   SpO2: 98%   Weight: 93 kg (205 lb)   Height: 1.753 m (5' 9\")       Physical Examination:  General: Well appearing, well-nourished, in no acute distress.  HEENT: Normocephalic atraumatic, pupils equal and reactive to light, extraocular muscles intact, no conjunctival injection, oropharynx clear without exudates.  Neck: Normal carotid arterial pulses, no arterial bruits, no thyromegaly.  Cardiac: Regular rhythm and normal heart rate.  S1, S2 present and normal.  No murmurs, rubs, or gallops.  PMI is nondisplaced. Jugular venous pulsations are normal.  Pulmonary: Normal breath sounds, no increased work of breathing, no wheezes or crackles.  GI: Normal bowel sounds, abdominal aorta not enlarged, no hepatosplenomegaly, no abdominal bruits.  Lower extremities: No cyanosis, clubbing, or edema.  No xanthelasma present. Normal distal pulses.  Skin: Skin intact. No significant rashes or lesions present.  Neuro: Alert and oriented x 3, normal attention and cognition, no focal motor or sensory neurologic deficits.  Psych: Normal affect and mood.  Musculoskeletal: Normal gait normal muscle tone.    Laboratory Studies:  Lab Results   Component Value Date    GLUCOSE 93 08/26/2024    CALCIUM 9.5 08/26/2024     08/26/2024    K 4.2 08/26/2024    CO2 24 08/26/2024     08/26/2024    BUN 12 08/26/2024    CREATININE 0.80 08/26/2024     Lab Results   Component Value Date    ALT 14 08/26/2024    AST 21 08/26/2024 " "   GGT 14 2024    ALKPHOS 64 2024    BILITOT 1.0 2024     Results from last 7 days   Lab Units 24  0910   CHOLESTEROL mg/dL 95   TRIGLYCERIDES mg/dL 45   HDL mg/dL 40.1     Lab Results   Component Value Date    CHOL 95 2024    CHOL 149 2024     Lab Results   Component Value Date    HDL 40.1 2024    HDL 40.3 2024     Lab Results   Component Value Date    LDLCALC 46 2024    LDLCALC 98 2024     Lab Results   Component Value Date    TRIG 45 2024    TRIG 54 2024     No components found for: \"CHOLHDL\"  Lab Results   Component Value Date    HGBA1C 5.0 2024       Cardiology Tests:  EC2024  ECG in the office today shows normal sinus rhythm, ventricular rate of 64 bpm, normal intervals and axis, normal ECG.    Stress Test:  Stress Test 2024  The patient exercised to stage IV on a Isael protocol for 11 minutes and 33 seconds, achieving 12 METS.   Stress ECG: Stress ECG showed sinus tachycardia, with PVC's. No ST changes.    1. Adequate level of stress achieved.   2. Negative exercise ECG for inducible ischemia at a high work load.    Cardiac Imaging:  CT cardiac scoring wo IV contrast 2024  Left Main (LM) 0  Left Anterior Descending (LAD) 0  Left Circumflex (LCX) 0  Right Coronary Artery (RCA) 1338  Total Agatston Score 1338  Coronary Calcium Score: 1338  Pericardial calcifications are present which may be indicative of  previous inflammatory change of the pericardium.    Carotid duplex 2024  Normal    Assessment and Plan:  Problem List Items Addressed This Visit    None  Visit Diagnoses       Agatston coronary artery calcium score greater than 400        Relevant Medications    aspirin 81 mg chewable tablet    Other Relevant Orders    ECG 12 Lead          Edwardo Izquierdo is a 44 y.o. male with no prior medical history who is referred after an executive physical noted an elevated coronary calcium score on screening test.  His " calcium score is in the very elevated range although I wonder if it may be related to sequela of pericardial inflammation such as from a prior infection given that it is any single-vessel and he has no other significant risk factors for atherosclerosis.  Nevertheless for further restratification purposes we discussed referral for CT coronary angiogram to rule out obstructive coronary artery disease.  I did discuss that if it would be abnormal we would need to refer him for cardiac catheterization.  I also ordered low-dose metoprolol to be taken 1 to 2 hours prior to the scan and place an order for repeat creatinine/eGFR if needed for the test.  He did start low-dose aspirin and atorvastatin and his cholesterol and repeat check was approximate 50% lower which is expected and appropriate.  After the test is completed I will contact him with the results and further recommendations.  Please do not hesitate call or contact me with questions or concerns.    Felton Knapp MD, FAFORREST, FACC  Director,  [facility]  Director,  [facility] Program  Associate Professor of Medicine  [employer]

## 2024-09-29 LAB
ATRIAL RATE: 64 BPM
P AXIS: 67 DEGREES
P OFFSET: 202 MS
P ONSET: 146 MS
PR INTERVAL: 152 MS
Q ONSET: 222 MS
QRS COUNT: 11 BEATS
QRS DURATION: 84 MS
QT INTERVAL: 374 MS
QTC CALCULATION(BAZETT): 385 MS
QTC FREDERICIA: 382 MS
R AXIS: 26 DEGREES
T AXIS: 67 DEGREES
T OFFSET: 409 MS
VENTRICULAR RATE: 64 BPM

## 2024-09-30 ENCOUNTER — HOSPITAL ENCOUNTER (OUTPATIENT)
Dept: RADIOLOGY | Facility: CLINIC | Age: 44
Discharge: HOME | End: 2024-09-30
Payer: COMMERCIAL

## 2024-09-30 VITALS — SYSTOLIC BLOOD PRESSURE: 129 MMHG | DIASTOLIC BLOOD PRESSURE: 77 MMHG | HEART RATE: 72 BPM | OXYGEN SATURATION: 100 %

## 2024-09-30 DIAGNOSIS — R93.1 AGATSTON CORONARY ARTERY CALCIUM SCORE GREATER THAN 400: ICD-10-CM

## 2024-09-30 PROCEDURE — 2500000001 HC RX 250 WO HCPCS SELF ADMINISTERED DRUGS (ALT 637 FOR MEDICARE OP): Performed by: INTERNAL MEDICINE

## 2024-09-30 PROCEDURE — 2550000001 HC RX 255 CONTRASTS: Performed by: INTERNAL MEDICINE

## 2024-09-30 PROCEDURE — 75574 CT ANGIO HRT W/3D IMAGE: CPT | Performed by: INTERNAL MEDICINE

## 2024-09-30 PROCEDURE — 75574 CT ANGIO HRT W/3D IMAGE: CPT

## 2024-09-30 RX ORDER — NITROGLYCERIN 400 UG/1
2 SPRAY ORAL ONCE
Status: COMPLETED | OUTPATIENT
Start: 2024-09-30 | End: 2024-09-30

## 2024-10-10 ENCOUNTER — APPOINTMENT (OUTPATIENT)
Dept: CARDIOLOGY | Facility: CLINIC | Age: 44
End: 2024-10-10
Payer: COMMERCIAL

## 2024-10-25 ENCOUNTER — HOSPITAL ENCOUNTER (OUTPATIENT)
Dept: RADIOLOGY | Facility: CLINIC | Age: 44
End: 2024-10-25
Payer: COMMERCIAL

## 2024-11-01 ENCOUNTER — APPOINTMENT (OUTPATIENT)
Dept: PRIMARY CARE | Facility: CLINIC | Age: 44
End: 2024-11-01
Payer: COMMERCIAL

## 2024-11-01 VITALS
BODY MASS INDEX: 31.4 KG/M2 | DIASTOLIC BLOOD PRESSURE: 84 MMHG | TEMPERATURE: 97.9 F | HEART RATE: 85 BPM | OXYGEN SATURATION: 99 % | WEIGHT: 212 LBS | SYSTOLIC BLOOD PRESSURE: 112 MMHG | RESPIRATION RATE: 16 BRPM | HEIGHT: 69 IN

## 2024-11-01 DIAGNOSIS — Z00.00 HEALTH MAINTENANCE EXAMINATION: ICD-10-CM

## 2024-11-01 DIAGNOSIS — E78.5 HYPERLIPIDEMIA, UNSPECIFIED HYPERLIPIDEMIA TYPE: ICD-10-CM

## 2024-11-01 DIAGNOSIS — Z12.11 SCREEN FOR COLON CANCER: Primary | ICD-10-CM

## 2024-11-01 ASSESSMENT — ENCOUNTER SYMPTOMS
MYALGIAS: 0
DIZZINESS: 0
NAUSEA: 0
DIARRHEA: 0
VOMITING: 0
DIAPHORESIS: 0
LIGHT-HEADEDNESS: 0
UNEXPECTED WEIGHT CHANGE: 0
CHILLS: 0
FEVER: 0
SHORTNESS OF BREATH: 0
DYSURIA: 0

## 2024-11-01 ASSESSMENT — PATIENT HEALTH QUESTIONNAIRE - PHQ9
2. FEELING DOWN, DEPRESSED OR HOPELESS: NOT AT ALL
1. LITTLE INTEREST OR PLEASURE IN DOING THINGS: NOT AT ALL
SUM OF ALL RESPONSES TO PHQ9 QUESTIONS 1 & 2: 0

## 2024-12-17 LAB — NONINV COLON CA DNA+OCC BLD SCRN STL QL: POSITIVE

## 2024-12-19 DIAGNOSIS — R19.5 ABNORMAL FINDINGS IN STOOL: Primary | ICD-10-CM

## 2025-03-03 ENCOUNTER — APPOINTMENT (OUTPATIENT)
Dept: OPERATING ROOM | Facility: CLINIC | Age: 45
End: 2025-03-03
Payer: COMMERCIAL

## 2025-04-15 ENCOUNTER — PREP FOR PROCEDURE (OUTPATIENT)
Dept: SURGERY | Facility: HOSPITAL | Age: 45
End: 2025-04-15
Payer: COMMERCIAL

## 2025-04-15 RX ORDER — SODIUM CHLORIDE, SODIUM LACTATE, POTASSIUM CHLORIDE, CALCIUM CHLORIDE 600; 310; 30; 20 MG/100ML; MG/100ML; MG/100ML; MG/100ML
100 INJECTION, SOLUTION INTRAVENOUS CONTINUOUS
Status: CANCELLED | OUTPATIENT
Start: 2025-04-15 | End: 2025-04-16

## 2025-04-17 ENCOUNTER — ANESTHESIA (OUTPATIENT)
Dept: GASTROENTEROLOGY | Facility: HOSPITAL | Age: 45
End: 2025-04-17
Payer: COMMERCIAL

## 2025-04-17 ENCOUNTER — HOSPITAL ENCOUNTER (OUTPATIENT)
Dept: GASTROENTEROLOGY | Facility: HOSPITAL | Age: 45
Discharge: HOME | End: 2025-04-17
Payer: COMMERCIAL

## 2025-04-17 ENCOUNTER — ANESTHESIA EVENT (OUTPATIENT)
Dept: GASTROENTEROLOGY | Facility: HOSPITAL | Age: 45
End: 2025-04-17
Payer: COMMERCIAL

## 2025-04-17 VITALS
HEART RATE: 60 BPM | SYSTOLIC BLOOD PRESSURE: 110 MMHG | DIASTOLIC BLOOD PRESSURE: 75 MMHG | RESPIRATION RATE: 18 BRPM | BODY MASS INDEX: 30.06 KG/M2 | TEMPERATURE: 97.3 F | OXYGEN SATURATION: 98 % | HEIGHT: 70 IN | WEIGHT: 210 LBS

## 2025-04-17 DIAGNOSIS — R19.5 ABNORMAL FINDINGS IN STOOL: ICD-10-CM

## 2025-04-17 PROCEDURE — 2500000001 HC RX 250 WO HCPCS SELF ADMINISTERED DRUGS (ALT 637 FOR MEDICARE OP): Performed by: STUDENT IN AN ORGANIZED HEALTH CARE EDUCATION/TRAINING PROGRAM

## 2025-04-17 PROCEDURE — 45380 COLONOSCOPY AND BIOPSY: CPT | Performed by: STUDENT IN AN ORGANIZED HEALTH CARE EDUCATION/TRAINING PROGRAM

## 2025-04-17 PROCEDURE — 2500000004 HC RX 250 GENERAL PHARMACY W/ HCPCS (ALT 636 FOR OP/ED): Performed by: NURSE ANESTHETIST, CERTIFIED REGISTERED

## 2025-04-17 PROCEDURE — 7100000009 HC PHASE TWO TIME - INITIAL BASE CHARGE: Performed by: STUDENT IN AN ORGANIZED HEALTH CARE EDUCATION/TRAINING PROGRAM

## 2025-04-17 PROCEDURE — 7100000010 HC PHASE TWO TIME - EACH INCREMENTAL 1 MINUTE: Performed by: STUDENT IN AN ORGANIZED HEALTH CARE EDUCATION/TRAINING PROGRAM

## 2025-04-17 PROCEDURE — 3700000001 HC GENERAL ANESTHESIA TIME - INITIAL BASE CHARGE: Performed by: STUDENT IN AN ORGANIZED HEALTH CARE EDUCATION/TRAINING PROGRAM

## 2025-04-17 PROCEDURE — 3700000002 HC GENERAL ANESTHESIA TIME - EACH INCREMENTAL 1 MINUTE: Performed by: STUDENT IN AN ORGANIZED HEALTH CARE EDUCATION/TRAINING PROGRAM

## 2025-04-17 RX ORDER — LIDOCAINE HYDROCHLORIDE 20 MG/ML
INJECTION, SOLUTION INFILTRATION; PERINEURAL AS NEEDED
Status: DISCONTINUED | OUTPATIENT
Start: 2025-04-17 | End: 2025-04-17

## 2025-04-17 RX ORDER — DEXTROMETHORPHAN/PSEUDOEPHED 2.5-7.5/.8
DROPS ORAL AS NEEDED
Status: COMPLETED | OUTPATIENT
Start: 2025-04-17 | End: 2025-04-17

## 2025-04-17 RX ORDER — PROPOFOL 10 MG/ML
INJECTION, EMULSION INTRAVENOUS AS NEEDED
Status: DISCONTINUED | OUTPATIENT
Start: 2025-04-17 | End: 2025-04-17

## 2025-04-17 RX ADMIN — SODIUM CHLORIDE, POTASSIUM CHLORIDE, SODIUM LACTATE AND CALCIUM CHLORIDE: 600; 310; 30; 20 INJECTION, SOLUTION INTRAVENOUS at 11:33

## 2025-04-17 RX ADMIN — LIDOCAINE HYDROCHLORIDE 50 MG: 20 INJECTION, SOLUTION INFILTRATION; PERINEURAL at 11:38

## 2025-04-17 RX ADMIN — PROPOFOL 120 MCG/KG/MIN: 10 INJECTION, EMULSION INTRAVENOUS at 11:41

## 2025-04-17 RX ADMIN — SIMETHICONE 333 MG: 20 EMULSION ORAL at 11:49

## 2025-04-17 RX ADMIN — PROPOFOL 100 MG: 10 INJECTION, EMULSION INTRAVENOUS at 11:40

## 2025-04-17 SDOH — HEALTH STABILITY: MENTAL HEALTH: CURRENT SMOKER: 0

## 2025-04-17 ASSESSMENT — ENCOUNTER SYMPTOMS
SHORTNESS OF BREATH: 0
LIGHT-HEADEDNESS: 0
NAUSEA: 0
DIARRHEA: 0
CHILLS: 0
FEVER: 0
DYSURIA: 0
HEMATURIA: 0
AGITATION: 0
NUMBNESS: 0
TROUBLE SWALLOWING: 0
JOINT SWELLING: 0
COLOR CHANGE: 0
DIZZINESS: 0
VOMITING: 0
ABDOMINAL PAIN: 0
DIFFICULTY URINATING: 0
ARTHRALGIAS: 0

## 2025-04-17 ASSESSMENT — PAIN SCALES - GENERAL
PAINLEVEL_OUTOF10: 0 - NO PAIN

## 2025-04-17 ASSESSMENT — COLUMBIA-SUICIDE SEVERITY RATING SCALE - C-SSRS
2. HAVE YOU ACTUALLY HAD ANY THOUGHTS OF KILLING YOURSELF?: NO
6. HAVE YOU EVER DONE ANYTHING, STARTED TO DO ANYTHING, OR PREPARED TO DO ANYTHING TO END YOUR LIFE?: NO
1. IN THE PAST MONTH, HAVE YOU WISHED YOU WERE DEAD OR WISHED YOU COULD GO TO SLEEP AND NOT WAKE UP?: NO

## 2025-04-17 ASSESSMENT — PAIN - FUNCTIONAL ASSESSMENT
PAIN_FUNCTIONAL_ASSESSMENT: 0-10

## 2025-04-17 NOTE — ANESTHESIA POSTPROCEDURE EVALUATION
Patient: Edwardo Izquierdo    Procedure Summary       Date: 04/17/25 Room / Location: [facility]    Anesthesia Start: 1137 Anesthesia Stop: 1219    Procedure: COLONOSCOPY Diagnosis: Abnormal findings in stool    Scheduled Providers: Hossein Mcclure MD Responsible Provider: Andres Carrasco MD    Anesthesia Type: MAC ASA Status: Not recorded            Anesthesia Type: MAC    Vitals Value Taken Time   /55 04/17/25 12:19   Temp 37.2 04/17/25 12:19   Pulse 77 04/17/25 12:19   Resp 18 04/17/25 12:19   SpO2 99 04/17/25 12:19       Anesthesia Post Evaluation    Patient location during evaluation: PACU  Patient participation: complete - patient participated  Level of consciousness: awake  Pain management: adequate  Multimodal analgesia pain management approach  Airway patency: patent  Cardiovascular status: acceptable, hemodynamically stable and stable  Respiratory status: acceptable and room air  Hydration status: acceptable  Postoperative Nausea and Vomiting: none        No notable events documented.

## 2025-04-17 NOTE — ANESTHESIA PREPROCEDURE EVALUATION
Patient: Edwardo Izquierdo    Procedure Information       Anesthesia Start Date/Time: 04/17/25 1137    Scheduled providers: Hossein Mcclure MD    Procedure: COLONOSCOPY    Location: [facility]            Relevant Problems   Cardiac   (+) Hyperlipidemia       Clinical information reviewed:   Tobacco  Allergies  Meds   Med Hx  Surg Hx   Fam Hx  Soc Hx        NPO Detail:  NPO/Void Status  Carbohydrate Drink Given Prior to Surgery? : N  Date of Last Liquid: 04/17/25  Time of Last Liquid: 0400  Date of Last Solid: 04/15/25  Time of Last Solid: 2000  Last Intake Type: Clear fluids  Time of Last Void: 1006         Physical Exam    Airway  Mallampati: II  TM distance: >3 FB  Neck ROM: full  Mouth opening: 3 or more finger widths     Cardiovascular   Rhythm: regular  Rate: normal     Dental - normal exam     Pulmonary Breath sounds clear to auscultation     Abdominal            Anesthesia Plan    History of general anesthesia?: yes  History of complications of general anesthesia?: no    ASA 2     MAC     The patient is not a current smoker.    intravenous induction   Anesthetic plan and risks discussed with patient.    Plan discussed with CRNA.

## 2025-04-17 NOTE — H&P
History Of Present Illness  Edwardo Izquierdo is a 44 y.o. male presenting with positive cologuard for first colonoscopy. He denies blood in his stool or problems with bowel movements.     Past Medical History  Medical History[1]    Surgical History  Surgical History[2]     Social History  He reports that he has never smoked. He has never used smokeless tobacco. He reports current alcohol use of about 3.0 standard drinks of alcohol per week. He reports that he does not currently use drugs after having used the following drugs: Marijuana.    Family History  Family History[3]  Family history of colon cancer in mom's sister.     Allergies  Patient has no known allergies.    Review of Systems   Constitutional:  Negative for chills and fever.   HENT:  Negative for trouble swallowing.    Respiratory:  Negative for shortness of breath.    Cardiovascular:  Negative for chest pain.   Gastrointestinal:  Negative for abdominal pain, diarrhea, nausea and vomiting.   Genitourinary:  Negative for difficulty urinating, dysuria and hematuria.   Musculoskeletal:  Negative for arthralgias and joint swelling.   Skin:  Negative for color change.   Neurological:  Negative for dizziness, light-headedness and numbness.   Psychiatric/Behavioral:  Negative for agitation and behavioral problems.         Physical Exam  Vitals reviewed.   Constitutional:       General: He is not in acute distress.     Appearance: Normal appearance.   HENT:      Head: Normocephalic and atraumatic.   Cardiovascular:      Rate and Rhythm: Normal rate and regular rhythm.   Pulmonary:      Effort: Pulmonary effort is normal. No respiratory distress.   Abdominal:      General: Abdomen is flat.   Musculoskeletal:         General: No swelling, tenderness or deformity.   Skin:     General: Skin is warm and dry.   Neurological:      General: No focal deficit present.      Mental Status: He is alert and oriented to person, place, and time.   Psychiatric:         Mood and  "Affect: Mood normal.         Behavior: Behavior normal.         Thought Content: Thought content normal.          Last Recorded Vitals  Blood pressure 126/82, pulse 74, temperature 36.8 °C (98.2 °F), temperature source Temporal, resp. rate 18, height 1.778 m (5' 10\"), weight 95.3 kg (210 lb), SpO2 99%.    Relevant Results  N/A     Assessment & Plan  Abnormal findings in stool      Colonoscopy with Dr. Mcclure.    I spent 5 minutes in the professional and overall care of this patient.      Jenny Clinton MD         [1] History reviewed. No pertinent past medical history.  [2] History reviewed. No pertinent surgical history.  [3]   Family History  Problem Relation Name Age of Onset    Hyperlipidemia Father      Other (discoid lupus) Brother      Colon cancer Mother's Sister      Cancer Mother's Brother      Ovarian cancer Maternal Grandmother      Diabetes Maternal Grandfather      Stroke Paternal Grandmother      Heart disease Paternal Grandfather       "

## 2025-04-30 LAB
LABORATORY COMMENT REPORT: NORMAL
PATH REPORT.COMMENTS IMP SPEC: NORMAL
PATH REPORT.FINAL DX SPEC: NORMAL
PATH REPORT.GROSS SPEC: NORMAL
PATH REPORT.RELEVANT HX SPEC: NORMAL
PATH REPORT.TOTAL CANCER: NORMAL

## 2025-07-28 ENCOUNTER — APPOINTMENT (OUTPATIENT)
Dept: PRIMARY CARE | Facility: CLINIC | Age: 45
End: 2025-07-28
Payer: COMMERCIAL

## 2025-07-28 VITALS
HEART RATE: 65 BPM | HEIGHT: 70 IN | DIASTOLIC BLOOD PRESSURE: 71 MMHG | BODY MASS INDEX: 30.32 KG/M2 | WEIGHT: 211.8 LBS | SYSTOLIC BLOOD PRESSURE: 105 MMHG | TEMPERATURE: 97 F | OXYGEN SATURATION: 98 %

## 2025-07-28 DIAGNOSIS — Z00.00 HEALTHCARE MAINTENANCE: ICD-10-CM

## 2025-07-28 DIAGNOSIS — E78.5 HYPERLIPIDEMIA, UNSPECIFIED HYPERLIPIDEMIA TYPE: Primary | ICD-10-CM

## 2025-07-28 DIAGNOSIS — Z00.00 HEALTH MAINTENANCE EXAMINATION: ICD-10-CM

## 2025-07-28 DIAGNOSIS — R93.1 AGATSTON CORONARY ARTERY CALCIUM SCORE GREATER THAN 400: ICD-10-CM

## 2025-07-28 DIAGNOSIS — Z12.5 SCREENING FOR PROSTATE CANCER: ICD-10-CM

## 2025-07-28 PROCEDURE — 3008F BODY MASS INDEX DOCD: CPT | Performed by: STUDENT IN AN ORGANIZED HEALTH CARE EDUCATION/TRAINING PROGRAM

## 2025-07-28 PROCEDURE — 99396 PREV VISIT EST AGE 40-64: CPT | Performed by: STUDENT IN AN ORGANIZED HEALTH CARE EDUCATION/TRAINING PROGRAM

## 2025-07-28 PROCEDURE — 1036F TOBACCO NON-USER: CPT | Performed by: STUDENT IN AN ORGANIZED HEALTH CARE EDUCATION/TRAINING PROGRAM

## 2025-07-28 ASSESSMENT — ENCOUNTER SYMPTOMS
CHILLS: 0
CONSTIPATION: 0
PALPITATIONS: 0
FEVER: 0
DYSURIA: 0
LIGHT-HEADEDNESS: 0
UNEXPECTED WEIGHT CHANGE: 0
MYALGIAS: 0
DIARRHEA: 0
SHORTNESS OF BREATH: 0
DIAPHORESIS: 0
DIZZINESS: 0
VOMITING: 0
NAUSEA: 0

## 2025-07-28 ASSESSMENT — PROMIS GLOBAL HEALTH SCALE
RATE_GENERAL_HEALTH: GOOD
CARRYOUT_PHYSICAL_ACTIVITIES: COMPLETELY
RATE_PHYSICAL_HEALTH: GOOD
CARRYOUT_SOCIAL_ACTIVITIES: GOOD
RATE_AVERAGE_FATIGUE: MILD
EMOTIONAL_PROBLEMS: SOMETIMES
RATE_SOCIAL_SATISFACTION: GOOD
RATE_QUALITY_OF_LIFE: GOOD
RATE_AVERAGE_PAIN: 3
RATE_MENTAL_HEALTH: GOOD

## 2025-07-28 ASSESSMENT — PATIENT HEALTH QUESTIONNAIRE - PHQ9
2. FEELING DOWN, DEPRESSED OR HOPELESS: NOT AT ALL
SUM OF ALL RESPONSES TO PHQ9 QUESTIONS 1 AND 2: 0
1. LITTLE INTEREST OR PLEASURE IN DOING THINGS: NOT AT ALL

## 2025-07-28 NOTE — PROGRESS NOTES
"Subjective   Patient ID: Edwardo Izquierdo is a 44 y.o. male who presents for Annual Exam (Patient is here for physical/).  History of Present Illness  The patient presents for a physical examination.    The patient reports experiencing acute onset of lower back pain this morning following the lifting of a paddle board. He denies any associated numbness, paresthesia, loss of bowel or bladder control, or muscular weakness.  He recalls a similar episode of back pain approximately 10 years ago which was self-limiting.    The patient discontinued statin therapy in December 2024 due to perceived musculoskeletal inflammation and pain and subsequently improved. He reports managing joint and muscle pain with Naproxen (Aleve) and believes that discontinuation of the statin has alleviated stiffness and soreness.          Review of Systems   Constitutional:  Negative for chills, diaphoresis, fever and unexpected weight change.   HENT:  Negative for hearing loss.    Eyes:  Negative for visual disturbance.   Respiratory:  Negative for shortness of breath.    Cardiovascular:  Negative for chest pain, palpitations and leg swelling.        No SOB or CP going up 2 flights of stairs.    Gastrointestinal:  Negative for constipation, diarrhea, nausea and vomiting.   Endocrine: Negative for cold intolerance and heat intolerance.   Genitourinary:  Negative for dysuria, scrotal swelling and testicular pain.   Musculoskeletal:  Negative for myalgias.   Skin:  Negative for rash.   Neurological:  Negative for dizziness, syncope and light-headedness.       Objective     /71 (BP Location: Left arm, Patient Position: Sitting, BP Cuff Size: Adult)   Pulse 65   Temp 36.1 °C (97 °F) (Temporal)   Ht 1.778 m (5' 10\")   Wt 96.1 kg (211 lb 12.8 oz)   SpO2 98%   BMI 30.39 kg/m²      Physical Exam  GENERAL: Alert, cooperative, well developed, no acute distress, no diaphoresis.  HEENT: Normocephalic, normal oropharynx, moist mucous membranes, ear " canals and tympanic membranes normal bilaterally, no oral lesions, no oral erythema.   NECK: Supple, trachea midline, no cervical lymphadenopathy.  CHEST: Clear to auscultation bilaterally, no wheezes, rhonchi, or rales.  CARDIOVASCULAR: Normal heart rate and rhythm, S1 and S2 normal without murmurs, rubs, or gallops.  ABDOMEN: Soft, non-tender, non-distended, normal bowel sounds present in all four quadrants. No rebound or guarding.  EXTREMITIES: No cyanosis or edema.  NEUROLOGICAL: Moves all extremities without gross motor deficit.  PSYCH: Mood and affect normal.       Assessment & Plan  Back pain:  - No saddle paresthesia, urinary fecal incontinence or weakness.  Advised ER if these symptoms occur.  - Typically self limiting per patient, he declines any therapy for now.  He will let us know if symptoms change or worsen.    Cholesterol management:  - Stopped atorvastatin 20 mg in 12/2024 due to perceived inflammation and pain  - Reached out to Dr. Knapp who recommended continuing to stop statin therapy and monitoring response  - Consider low-dose rosuvastatin if necessary  - Order blood work to check cholesterol levels    Follow-up:  - In 1 year or sooner if needed  - Fasting labs ordered    Saw rheuatmology for 3 years for concerns of possible lupus. Aleve a few times a week helps joint pains. Was told does not need to see anymore. Per last note no evidence of active lupus or connective tissue disease and will see PRN.      Health Maintenance  -Prostate Cancer Screening: PSA normal 8/24  -Vaccinations: UTD flu vaccine.  Recommend COVID booster.  Tdap up-to-date. Reports UTD childhood vaccines.   -Lung Cancer Screening: Not indicated  -AAA Screening:Not indicated  -Colonoscopy: Done 4/2025, repeat 2030  -HIV Screen: He deferred    CPE completed.  Advised to keep a heart healthy, low fat  diet with fruits and veggies like Mediterranean diet.  Advised on the importance of exercise and maintaining 150 minutes of  exercise per week (30 minutes per day 5 days a week).  Advised on regular eye and dental visits.  Discussed age appropriate cancer screening, immunizations and recommendations given.  Discussed avoiding illicit drugs and tobacco. Advised on appropriate use of alcohol.  Advised to wear seat belt.    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 The content has been reviewed and confirmed by the healthcare provider for accuracy and completeness. Patient consented to the use of audio recording and use of AI during their visit.

## 2025-07-29 ENCOUNTER — TELEPHONE (OUTPATIENT)
Dept: PRIMARY CARE | Facility: CLINIC | Age: 45
End: 2025-07-29
Payer: COMMERCIAL

## 2025-07-29 NOTE — TELEPHONE ENCOUNTER
----- Message from Smooth Palma sent at 7/29/2025  8:45 AM EDT -----  Please call the lab to add on A1c

## 2025-07-30 LAB
ALBUMIN SERPL-MCNC: 4.8 G/DL (ref 3.6–5.1)
ALP SERPL-CCNC: 61 U/L (ref 36–130)
ALT SERPL-CCNC: 12 U/L (ref 9–46)
ANION GAP SERPL CALCULATED.4IONS-SCNC: 7 MMOL/L (CALC) (ref 7–17)
AST SERPL-CCNC: 15 U/L (ref 10–40)
BASOPHILS # BLD AUTO: 37 CELLS/UL (ref 0–200)
BASOPHILS NFR BLD AUTO: 0.6 %
BILIRUB SERPL-MCNC: 0.7 MG/DL (ref 0.2–1.2)
BUN SERPL-MCNC: 12 MG/DL (ref 7–25)
CALCIUM SERPL-MCNC: 9.6 MG/DL (ref 8.6–10.3)
CHLORIDE SERPL-SCNC: 104 MMOL/L (ref 98–110)
CHOLEST SERPL-MCNC: 170 MG/DL
CHOLEST/HDLC SERPL: 3.1 (CALC)
CO2 SERPL-SCNC: 29 MMOL/L (ref 20–32)
CREAT SERPL-MCNC: 0.9 MG/DL (ref 0.6–1.29)
EGFRCR SERPLBLD CKD-EPI 2021: 107 ML/MIN/1.73M2
EOSINOPHIL # BLD AUTO: 68 CELLS/UL (ref 15–500)
EOSINOPHIL NFR BLD AUTO: 1.1 %
ERYTHROCYTE [DISTWIDTH] IN BLOOD BY AUTOMATED COUNT: 12.3 % (ref 11–15)
GLUCOSE SERPL-MCNC: 102 MG/DL (ref 65–99)
HBA1C MFR BLD: 5.2 %
HCT VFR BLD AUTO: 49.7 % (ref 38.5–50)
HDLC SERPL-MCNC: 55 MG/DL
HGB BLD-MCNC: 16.6 G/DL (ref 13.2–17.1)
LDLC SERPL CALC-MCNC: 98 MG/DL (CALC)
LYMPHOCYTES # BLD AUTO: 1234 CELLS/UL (ref 850–3900)
LYMPHOCYTES NFR BLD AUTO: 19.9 %
MCH RBC QN AUTO: 31.1 PG (ref 27–33)
MCHC RBC AUTO-ENTMCNC: 33.4 G/DL (ref 32–36)
MCV RBC AUTO: 93.1 FL (ref 80–100)
MONOCYTES # BLD AUTO: 539 CELLS/UL (ref 200–950)
MONOCYTES NFR BLD AUTO: 8.7 %
NEUTROPHILS # BLD AUTO: 4321 CELLS/UL (ref 1500–7800)
NEUTROPHILS NFR BLD AUTO: 69.7 %
NONHDLC SERPL-MCNC: 115 MG/DL (CALC)
PLATELET # BLD AUTO: 237 THOUSAND/UL (ref 140–400)
PMV BLD REES-ECKER: 10.7 FL (ref 7.5–12.5)
POTASSIUM SERPL-SCNC: 5.1 MMOL/L (ref 3.5–5.3)
PROT SERPL-MCNC: 7.2 G/DL (ref 6.1–8.1)
PSA SERPL-MCNC: 0.77 NG/ML
RBC # BLD AUTO: 5.34 MILLION/UL (ref 4.2–5.8)
SODIUM SERPL-SCNC: 140 MMOL/L (ref 135–146)
TRIGL SERPL-MCNC: 81 MG/DL
TSH SERPL-ACNC: 1.76 MIU/L (ref 0.4–4.5)
WBC # BLD AUTO: 6.2 THOUSAND/UL (ref 3.8–10.8)

## 2026-07-31 ENCOUNTER — APPOINTMENT (OUTPATIENT)
Dept: PRIMARY CARE | Facility: CLINIC | Age: 46
End: 2026-07-31
Payer: COMMERCIAL